# Patient Record
Sex: FEMALE | Race: WHITE | HISPANIC OR LATINO | URBAN - METROPOLITAN AREA
[De-identification: names, ages, dates, MRNs, and addresses within clinical notes are randomized per-mention and may not be internally consistent; named-entity substitution may affect disease eponyms.]

---

## 2023-04-11 ENCOUNTER — APPOINTMENT (RX ONLY)
Dept: URBAN - METROPOLITAN AREA CLINIC 15 | Facility: CLINIC | Age: 33
Setting detail: DERMATOLOGY
End: 2023-04-11

## 2023-04-11 VITALS — HEIGHT: 61 IN | WEIGHT: 153 LBS

## 2023-04-11 DIAGNOSIS — Z41.9 ENCOUNTER FOR PROCEDURE FOR PURPOSES OTHER THAN REMEDYING HEALTH STATE, UNSPECIFIED: ICD-10-CM

## 2023-04-11 PROCEDURE — ? COSMETIC CONSULTATION: FILLERS

## 2023-04-11 PROCEDURE — ? FILLERS

## 2023-04-11 PROCEDURE — ? COSMETIC CONSULTATION: INMODE MORPHEUS 8

## 2023-04-11 NOTE — PROCEDURE: FILLERS
Include Cannula Information In Note?: No
Additional Area 4 Volume In Cc: 0
Filler: Restylane Kysse
Consent: Written consent obtained. Risks include but not limited to bruising, beading, irregular texture, ulceration, infection, allergic reaction, scar formation, incomplete augmentation, temporary nature, procedural pain.
Include Cannula Size?: 25G
Additional Area 3 Location: cheeks acne scars
Post-Care Instructions: Patient instructed to apply ice to reduce swelling.
Number Of Syringes (Required For Inventory): 1
Include Cannula Length?: 2 inch
Additional Area 4 Location: chest rejuvenation
Additional Area 5 Location: perioral lines
Inventory Information: This plan will send filler information to inventory based on the fillers you select. Multiple fillers can be sent but you must ensure you select the appropriate fillers in the inventory tab.
Map Statment: See [address] for Complete Details
Detail Level: Zone
Additional Area 2 Location: oral commissures, marionettes, chin shadowing
Additional Area 3 Location: mid face, cheeks
Show Inventory Tab: Show
Additional Area 1 Location: cutaneous lip
Additional Area 1 Location: chin
Include Cannula Brand?: DermaSculpt
Additional Area 2 Location: periorbital lines, Glabella
Additional Anesthesia Volume In Cc: 6